# Patient Record
Sex: MALE | Race: WHITE | NOT HISPANIC OR LATINO | URBAN - METROPOLITAN AREA
[De-identification: names, ages, dates, MRNs, and addresses within clinical notes are randomized per-mention and may not be internally consistent; named-entity substitution may affect disease eponyms.]

---

## 2019-09-12 ENCOUNTER — IMPORTED ENCOUNTER (OUTPATIENT)
Dept: URBAN - METROPOLITAN AREA CLINIC 50 | Facility: CLINIC | Age: 29
End: 2019-09-12

## 2021-04-17 ASSESSMENT — TONOMETRY
OS_IOP_MMHG: 12
OD_IOP_MMHG: 12

## 2021-04-17 ASSESSMENT — VISUAL ACUITY
OS_PH: 20/60-2
OS_SC: 20/100-1
OD_SC: 20/25

## 2022-06-09 ENCOUNTER — PREPPED CHART (OUTPATIENT)
Dept: URBAN - METROPOLITAN AREA CLINIC 53 | Facility: CLINIC | Age: 32
End: 2022-06-09

## 2022-06-10 ENCOUNTER — COMPREHENSIVE EXAM (OUTPATIENT)
Dept: URBAN - METROPOLITAN AREA CLINIC 53 | Facility: CLINIC | Age: 32
End: 2022-06-10

## 2022-06-10 DIAGNOSIS — H53.002: ICD-10-CM

## 2022-06-10 DIAGNOSIS — H52.03: ICD-10-CM

## 2022-06-10 PROCEDURE — 92014 COMPRE OPH EXAM EST PT 1/>: CPT

## 2022-06-10 PROCEDURE — 92015 DETERMINE REFRACTIVE STATE: CPT

## 2022-06-10 PROCEDURE — 92134 CPTRZ OPH DX IMG PST SGM RTA: CPT

## 2022-06-10 ASSESSMENT — KERATOMETRY
OS_K2POWER_DIOPTERS: 42.75
OS_AXISANGLE_DEGREES: 16
OD_AXISANGLE2_DEGREES: 69
OS_K1POWER_DIOPTERS: 41.50
OD_K2POWER_DIOPTERS: 42.25
OD_AXISANGLE_DEGREES: 159
OS_AXISANGLE2_DEGREES: 106
OD_K1POWER_DIOPTERS: 41.75

## 2022-06-10 ASSESSMENT — TONOMETRY
OS_IOP_MMHG: 12
OD_IOP_MMHG: 12

## 2022-06-10 ASSESSMENT — VISUAL ACUITY
OD_SC: 20/25-2
OU_SC: J1+

## 2022-06-10 NOTE — PATIENT DISCUSSION
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 +/- 0.25 was attempted, but patient prefers +1.75 SPH.

## 2022-06-10 NOTE — PATIENT DISCUSSION
Advised patient that it may take a few days to a week to adjust to new Rx. However, if after a week patient is still not able to tolerate the Rx, please contact us to further evaluate.

## 2023-07-05 ENCOUNTER — EMERGENCY VISIT (OUTPATIENT)
Dept: URBAN - METROPOLITAN AREA CLINIC 53 | Facility: CLINIC | Age: 33
End: 2023-07-05

## 2023-07-05 DIAGNOSIS — T15.02XA: ICD-10-CM

## 2023-07-05 PROCEDURE — 92012 INTRM OPH EXAM EST PATIENT: CPT

## 2023-07-05 RX ORDER — OFLOXACIN 3 MG/ML: 1 SOLUTION OPHTHALMIC

## 2023-07-05 ASSESSMENT — VISUAL ACUITY
OD_SC: 20/30
OS_SC: 20/150
OU_SC: 20/30

## 2023-07-05 ASSESSMENT — KERATOMETRY
OD_AXISANGLE2_DEGREES: 69
OS_K1POWER_DIOPTERS: 41.50
OS_AXISANGLE2_DEGREES: 106
OD_K1POWER_DIOPTERS: 41.75
OD_AXISANGLE_DEGREES: 159
OS_AXISANGLE_DEGREES: 16
OS_K2POWER_DIOPTERS: 42.75
OD_K2POWER_DIOPTERS: 42.25

## 2023-07-05 ASSESSMENT — TONOMETRY
OS_IOP_MMHG: 12
OD_IOP_MMHG: 12

## 2023-07-12 ENCOUNTER — COMPREHENSIVE EXAM (OUTPATIENT)
Dept: URBAN - METROPOLITAN AREA CLINIC 53 | Facility: CLINIC | Age: 33
End: 2023-07-12

## 2023-07-12 DIAGNOSIS — H52.03: ICD-10-CM

## 2023-07-12 DIAGNOSIS — T15.02XA: ICD-10-CM

## 2023-07-12 PROCEDURE — 92015 DETERMINE REFRACTIVE STATE: CPT

## 2023-07-12 PROCEDURE — 92014 COMPRE OPH EXAM EST PT 1/>: CPT

## 2023-07-12 ASSESSMENT — VISUAL ACUITY
OS_SC: J5
OD_CC: 20/30
OS_CC: 20/80
OD_SC: J1+

## 2023-07-12 ASSESSMENT — TONOMETRY
OS_IOP_MMHG: 14
OD_IOP_MMHG: 16

## 2023-07-12 ASSESSMENT — KERATOMETRY
OD_K2POWER_DIOPTERS: 42.50
OS_AXISANGLE_DEGREES: 11
OS_AXISANGLE2_DEGREES: 101
OD_AXISANGLE2_DEGREES: 77
OS_K1POWER_DIOPTERS: 41.50
OS_K2POWER_DIOPTERS: 43.00
OD_K1POWER_DIOPTERS: 41.75
OD_AXISANGLE_DEGREES: 167